# Patient Record
Sex: FEMALE | Race: WHITE | NOT HISPANIC OR LATINO | Employment: FULL TIME | URBAN - METROPOLITAN AREA
[De-identification: names, ages, dates, MRNs, and addresses within clinical notes are randomized per-mention and may not be internally consistent; named-entity substitution may affect disease eponyms.]

---

## 2023-10-24 PROBLEM — N92.6 ABNORMAL MENSES: Status: ACTIVE | Noted: 2023-10-24

## 2023-10-24 RX ORDER — SITAGLIPTIN AND METFORMIN HYDROCHLORIDE 1000; 50 MG/1; MG/1
TABLET, FILM COATED ORAL
COMMUNITY
Start: 2016-07-26 | End: 2024-01-26

## 2023-10-24 RX ORDER — GLIMEPIRIDE 2 MG/1
2 TABLET ORAL
COMMUNITY
Start: 2016-07-26

## 2023-10-26 ENCOUNTER — OFFICE VISIT (OUTPATIENT)
Dept: OBSTETRICS AND GYNECOLOGY | Facility: CLINIC | Age: 53
End: 2023-10-26
Payer: COMMERCIAL

## 2023-10-26 VITALS
WEIGHT: 231 LBS | DIASTOLIC BLOOD PRESSURE: 100 MMHG | SYSTOLIC BLOOD PRESSURE: 165 MMHG | HEIGHT: 66 IN | BODY MASS INDEX: 37.12 KG/M2

## 2023-10-26 DIAGNOSIS — R19.03 RIGHT LOWER QUADRANT ABDOMINAL SWELLING, MASS AND LUMP: ICD-10-CM

## 2023-10-26 DIAGNOSIS — R21 SKIN RASH: ICD-10-CM

## 2023-10-26 DIAGNOSIS — N89.8 VAGINAL DISCHARGE: Primary | ICD-10-CM

## 2023-10-26 PROCEDURE — 87205 SMEAR GRAM STAIN: CPT

## 2023-10-26 PROCEDURE — 99203 OFFICE O/P NEW LOW 30 MIN: CPT | Performed by: OBSTETRICS & GYNECOLOGY

## 2023-10-26 RX ORDER — FLUCONAZOLE 150 MG/1
150 TABLET ORAL ONCE
Qty: 2 TABLET | Refills: 1 | Status: SHIPPED | OUTPATIENT
Start: 2023-10-26 | End: 2023-10-26

## 2023-10-26 RX ORDER — CLOTRIMAZOLE AND BETAMETHASONE DIPROPIONATE 10; .64 MG/G; MG/G
1 CREAM TOPICAL 2 TIMES DAILY
Qty: 15 G | Refills: 2 | Status: SHIPPED | OUTPATIENT
Start: 2023-10-26 | End: 2023-11-23

## 2023-10-26 ASSESSMENT — ENCOUNTER SYMPTOMS
HEMATURIA: 0
DIARRHEA: 0
DYSURIA: 0
SHORTNESS OF BREATH: 0
ACTIVITY CHANGE: 0
CONSTIPATION: 0
NAUSEA: 0

## 2023-10-27 LAB
CLUE CELLS VAG LPF-#/AREA: ABNORMAL /[LPF]
NUGENT SCORE: 1
YEAST VAG WET PREP-#/AREA: PRESENT

## 2023-10-27 NOTE — PROGRESS NOTES
Subjective   Patient ID: Lindsey Tavares is a 53 y.o. female who presents for Mass (Lump on pelvic area).  HPI this is a 53-year-old female that has noted a lump that is near the right upper suprapubic area.  She does feel it may be at the edge of a prior  section scar.  She also mentions a rash under her pannus.  She does have type 2 diabetes.  She denies vaginal discharge, no postmenopausal bleeding, no pelvic pain, no dysuria or change in bowel habits.    Review of Systems   Constitutional:  Negative for activity change.   Respiratory:  Negative for shortness of breath.    Gastrointestinal:  Negative for constipation, diarrhea and nausea.   Genitourinary:  Negative for dysuria, hematuria, vaginal bleeding and vaginal discharge.       Objective this is a well-appearing female sitting comfortably on the table, in no distress.  She has a large abdominal pannus.  She points to an area at the right lower abdomen, with some soft changes that could be consistent with a incisional hernia.  There is no inguinal hernia noted.  On lifting the pannus there is patchy areas of well-demarcated red areas varying in size, largest about 1 x 1 cm.  The could be suspicious for a dermatological source such as psoriasis but she declines having this disease.  On external genitalia exam there were no lesions.  On speculum exam a swab was collected for BV and yeast due to the presence of a small to moderate amount of white discharge.  Physical Exam    Assessment/Plan     This is a 53-year-old female with obesity and type 2 diabetes.  Some red patches could be from a yeast skin infection I did recommend using Diflucan orally and Lotrisone topically.  There is no acute changes on the right lower abdomen although I suspect a hernia could be present.  I placed a General surgery consult.  I did send a swab for BV and yeast from the vaginal discharge.  I do recommend also scheduling for routine follow-up as she is due for her Pap smear  as well.

## 2024-01-26 ENCOUNTER — OFFICE VISIT (OUTPATIENT)
Dept: OBSTETRICS AND GYNECOLOGY | Facility: CLINIC | Age: 54
End: 2024-01-26
Payer: COMMERCIAL

## 2024-01-26 VITALS
HEIGHT: 66 IN | HEART RATE: 98 BPM | WEIGHT: 233 LBS | DIASTOLIC BLOOD PRESSURE: 93 MMHG | BODY MASS INDEX: 37.45 KG/M2 | SYSTOLIC BLOOD PRESSURE: 166 MMHG

## 2024-01-26 DIAGNOSIS — N95.8 GENITOURINARY SYNDROME OF MENOPAUSE: ICD-10-CM

## 2024-01-26 DIAGNOSIS — Z01.419 ENCOUNTER FOR GYNECOLOGICAL EXAMINATION WITHOUT ABNORMAL FINDING: Primary | ICD-10-CM

## 2024-01-26 PROCEDURE — 88175 CYTOPATH C/V AUTO FLUID REDO: CPT

## 2024-01-26 PROCEDURE — 1036F TOBACCO NON-USER: CPT | Performed by: OBSTETRICS & GYNECOLOGY

## 2024-01-26 PROCEDURE — 87624 HPV HI-RISK TYP POOLED RSLT: CPT

## 2024-01-26 PROCEDURE — 99396 PREV VISIT EST AGE 40-64: CPT | Performed by: OBSTETRICS & GYNECOLOGY

## 2024-01-26 RX ORDER — METFORMIN HYDROCHLORIDE 1000 MG/1
1000 TABLET ORAL 2 TIMES DAILY
COMMUNITY

## 2024-01-26 RX ORDER — ESTRADIOL 0.1 MG/G
0.5 CREAM VAGINAL NIGHTLY
Qty: 42.5 G | Refills: 3 | Status: SHIPPED | OUTPATIENT
Start: 2024-01-26 | End: 2025-01-25

## 2024-01-26 RX ORDER — DULAGLUTIDE 1.5 MG/.5ML
1.5 INJECTION, SOLUTION SUBCUTANEOUS
COMMUNITY

## 2024-01-26 NOTE — PROGRESS NOTES
Subjective   Lindsey Tavares is a 53 y.o. female here for a routine exam. Current complaints: She has diabetes and occasionally has a skin rash that comes and goes.  She had a pelvic ultrasound in 2021 that was normal.  She has no postmenopausal bleeding, no dysuria or change in bowel habits.  She does notice vaginal dryness.  She is , and sexually active with her .  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  Both her daughters go there.. Personal health questionnaire reviewed: yes.     Gynecologic History  Patient's last menstrual period was 2020.  Contraception: post menopausal status  Last Pap: 19. Results were: normal  Last mammogram: 21. Results were: normal    Obstetric History  OB History    Para Term  AB Living   2 2           SAB IAB Ectopic Multiple Live Births                  # Outcome Date GA Lbr Deonte/2nd Weight Sex Delivery Anes PTL Lv   2 Para            1 Para                Objective   Constitutional: Alert and in no acute distress. Well developed, well nourished.   Head and Face: Head and face: Normal.    Eyes: Normal external exam - nonicteric sclera, extraocular movements intact (EOMI) and no ptosis.   Neck: No neck asymmetry. Supple. Thyroid not enlarged and there were no palpable thyroid nodules.    Pulmonary: No respiratory distress.   Chest: Breasts: Normal appearance, no nipple discharge and no skin changes. Palpation of breasts and axillae: No palpable mass and no axillary lymphadenopathy.   Abdomen: Soft nontender; no abdominal mass palpated. No organomegaly. No hernias.   Genitourinary: External genitalia: Normal. No inguinal lymphadenopathy. Bartholin's Urethral and Skenes Glands: Normal. Urethra: Normal.  Bladder: Normal on palpation. Vagina: Normal. Cervix: Normal.  Uterus: Normal.  Right Adnexa/parametria: Normal.  Left Adnexa/parametria: Normal.  Inspection of Perianal Area: Normal.   Musculoskeletal: No joint swelling seen, normal  movements of all extremities.   Skin: Normal skin color and pigmentation, normal skin turgor, and no rash.   Neurologic: Non-focal. Grossly intact.   Psychiatric: Alert and oriented x 3. Affect normal to patient baseline. Mood: Appropriate.  Physical Exam     Assessment/Plan   Healthy female exam.  This is a 53-year-old female that has occasional skin yeast infections.  She will use antifungal cream as needed.  We discussed beginning estradiol cream for the genitourinary syndrome of menopause.  She will use a pea-sized amount at the vaginal opening every night for 2 weeks, then 3 times weekly thereafter.  It could take 8 to 12 weeks or longer for the full effect.  A Pap smear was sent.  A mammogram is ordered with tomosynthesis.  I will see her routinely in 1 year.  Mammogram ordered.

## 2024-07-08 ENCOUNTER — TELEPHONE (OUTPATIENT)
Dept: OBSTETRICS AND GYNECOLOGY | Facility: CLINIC | Age: 54
End: 2024-07-08
Payer: COMMERCIAL

## 2024-07-08 DIAGNOSIS — N95.0 PMB (POSTMENOPAUSAL BLEEDING): Primary | ICD-10-CM

## 2024-07-08 NOTE — TELEPHONE ENCOUNTER
Patient had some postmenopausal spotting in may and now again in July. I put an order in for an ultrasound first, it just needs signed off on. Please advise of what to tell the patient

## 2024-07-08 NOTE — TELEPHONE ENCOUNTER
The patient called the office with episode of spotting in May and July.  I recommend a pelvic ultrasound for postmenopausal bleeding, this was ordered.  I do recommend that she return for an endometrial biopsy.  She will need Cytotec ordered for the night before, and I also recommend ibuprofen 600 mg at 1 hour before the visit for cramping.

## 2024-07-11 ENCOUNTER — TELEPHONE (OUTPATIENT)
Dept: OBSTETRICS AND GYNECOLOGY | Facility: CLINIC | Age: 54
End: 2024-07-11
Payer: COMMERCIAL

## 2024-07-16 ENCOUNTER — HOSPITAL ENCOUNTER (OUTPATIENT)
Dept: RADIOLOGY | Facility: CLINIC | Age: 54
Discharge: HOME | End: 2024-07-16
Payer: COMMERCIAL

## 2024-07-16 DIAGNOSIS — N95.0 PMB (POSTMENOPAUSAL BLEEDING): ICD-10-CM

## 2024-07-16 PROCEDURE — 76856 US EXAM PELVIC COMPLETE: CPT

## 2024-07-16 PROCEDURE — 76830 TRANSVAGINAL US NON-OB: CPT | Performed by: RADIOLOGY

## 2024-07-16 PROCEDURE — 76856 US EXAM PELVIC COMPLETE: CPT | Performed by: RADIOLOGY

## 2024-08-01 ENCOUNTER — APPOINTMENT (OUTPATIENT)
Dept: OBSTETRICS AND GYNECOLOGY | Facility: CLINIC | Age: 54
End: 2024-08-01
Payer: COMMERCIAL

## 2024-08-01 VITALS
WEIGHT: 241 LBS | SYSTOLIC BLOOD PRESSURE: 172 MMHG | DIASTOLIC BLOOD PRESSURE: 88 MMHG | BODY MASS INDEX: 38.9 KG/M2 | HEART RATE: 100 BPM

## 2024-08-01 DIAGNOSIS — N95.0 PMB (POSTMENOPAUSAL BLEEDING): Primary | ICD-10-CM

## 2024-08-01 PROCEDURE — 58100 BIOPSY OF UTERUS LINING: CPT | Performed by: OBSTETRICS & GYNECOLOGY

## 2024-08-01 PROCEDURE — 88305 TISSUE EXAM BY PATHOLOGIST: CPT

## 2024-08-01 RX ORDER — ATORVASTATIN CALCIUM 10 MG/1
10 TABLET, FILM COATED ORAL EVERY 24 HOURS
COMMUNITY
Start: 2024-04-16

## 2024-08-01 RX ORDER — INSULIN ASPART INJECTION 100 [IU]/ML
INJECTION, SOLUTION SUBCUTANEOUS
COMMUNITY
Start: 2024-06-21

## 2024-08-01 NOTE — PROGRESS NOTES
Patient ID: Lindsey Tavares is a 53 y.o. female with postmenopausal bleeding.  Pap smear January 26, 2024 was negative, high risk HPV negative.    Endometrial biopsy    Date/Time: 8/1/2024 11:15 AM    Performed by: Camilla Dejesus MD  Authorized by: Camilla Dejesus MD    Consent:     Consent obtained: written    Consent given by: patient    Risks discussed: bleeding and infection  Indications:     Indications: postmenopausal bleeding      Chronicity of post-menopausal bleeding: new    Progression of post-menopausal bleeding: waxing and waning  Pre-procedure:     Premeds: NSAID    Procedure:     Prepped with: Betadine    Tenaculum used: yes      Local anesthetic comment: HurriCaine spray was used.    Cervix dilated: no      Number of passes: 3    Number of passes comment: Cervix visualized after grasped with a tenaculum, moderate amount of mucousy material obtained, passed Pipelle x 3 to obtain scant tissue.    Findings:     Cervix: normal      Uterus depth by sound (cm): 6    Specimen collected: specimen collected and sent to pathology      Patient tolerance: tolerated well, no immediate complications  Comments:     Procedure comments: 53-year-old with episodes of brownish vaginal bleeding occasionally pink-tinged on tissue in May and July 2024.  Mild cramping with these episodes.  Cervix was initially poorly visualized, then grasped with a tenaculum.  Pipelle collecting mucousy material, Pipelle passed x 3 to obtain a scant amount of tissue.  Tolerated well.

## 2024-08-07 LAB
LABORATORY COMMENT REPORT: NORMAL
PATH REPORT.FINAL DX SPEC: NORMAL
PATH REPORT.GROSS SPEC: NORMAL
PATH REPORT.RELEVANT HX SPEC: NORMAL
PATH REPORT.TOTAL CANCER: NORMAL

## 2024-08-09 ENCOUNTER — TELEPHONE (OUTPATIENT)
Dept: OBSTETRICS AND GYNECOLOGY | Facility: CLINIC | Age: 54
End: 2024-08-09
Payer: COMMERCIAL

## 2024-08-11 NOTE — RESULT ENCOUNTER NOTE
The endometrial biopsy notes inactive endometrium, with fragments that suggest an endometrial polyp.  This would explain the ultrasound that noted endometrial fluid and a thickened endometrial stripe.  I recommend a hysteroscopy, D&C and removal of the polyp.  A member of the office staff will contact you regarding scheduling.

## 2024-08-11 NOTE — TELEPHONE ENCOUNTER
The endometrial biopsy is benign, but suggests a fragment of polyp.  This would explain the thickened endometrium on ultrasound, and the mucousy material at endometrial biopsy..  I recommend removing this polyp through an outpatient surgery called hysteroscopy, D&C, polypectomy.  I will forward this information to our  and she will contact you about scheduling.

## 2024-08-12 ENCOUNTER — PREP FOR PROCEDURE (OUTPATIENT)
Dept: OBSTETRICS AND GYNECOLOGY | Facility: CLINIC | Age: 54
End: 2024-08-12
Payer: COMMERCIAL

## 2024-08-12 DIAGNOSIS — R93.89 ENDOMETRIAL THICKENING ON ULTRASOUND: ICD-10-CM

## 2024-08-12 DIAGNOSIS — N95.0 PMB (POSTMENOPAUSAL BLEEDING): Primary | ICD-10-CM

## 2024-08-12 NOTE — TELEPHONE ENCOUNTER
Called patient and left voicemail to call office when ready to schedule surgery with Dr. Dejesus.    English Tilley

## 2024-08-30 RX ORDER — OMEPRAZOLE 20 MG/1
20 TABLET, DELAYED RELEASE ORAL
COMMUNITY

## 2024-08-30 NOTE — CPM/PAT H&P
CPM/PAT Evaluation       Name: Lindsey Tavares   /Age: 1970       TELEMEDICINE ENCOUNTER  Patient was interviewed by telephone for preadmission testing perioperative risk assessment prior to surgery.    DATE OF CONSULT: 2024  REFERRING PROVIDER: Dr. Dejesus  SURGERY, DATE, AND LENGTH: Diagnostic hysteroscopy; 2024; 60 minutes    CHIEF COMPLAINT  Postmenopausal bleeding    HPI  Lindsey Tavares is a 53-year-old female who had 2 episodes of postmenopausal spotting/bleeding visualized on bathroom tissue.  Patient had pelvic/transvaginal ultrasound that showed a 7 mm endometrial stripe.  She underwent an endometrial biopsy on 2024 that noted inactive endometrium with fragments that were suggestive of an endometrial polyp.  Patient now scheduled for recommended D&C hysteroscopy with removal of polyp.    ACTIVE PROBLEMS  Patient Active Problem List   Diagnosis    Abnormal menses    PMB (postmenopausal bleeding)    Endometrial thickening on ultrasound        PAST MEDICAL HISTORY  Past Medical History:   Diagnosis Date    GERD (gastroesophageal reflux disease)     Hyperlipidemia     Type 2 diabetes mellitus (Multi)         SURGICAL HISTORY  Past Surgical History:   Procedure Laterality Date     SECTION, CLASSIC N/A 2003    2006 second csection    OTHER SURGICAL HISTORY      Tonsillectomy and adenoidectomy at 6 years old        ANESTHESIA HISTORY  Denies problems with anesthesia in the past such as PONV, prolonged sedation, awareness, dental damage, aspiration, cardiac arrest, difficult intubation, or unexpected hospital admissions. Denies family history of malignant hyperthermia, or pseudocholinesterase deficiency.     SOCIAL HISTORY  Never smoker; occasional alcohol use; no recreational drug use.  Patient states she is able to do moderate ADLs such as heavy housework, light yard work.  Able to walk up a flight of stairs without shortness of breath.  Denies chest pain with activities  or at rest.  METS >4    FAMILY HISTORY  Family History   Problem Relation Name Age of Onset    Kidney disease Mother      Liver disease Mother      Heart disease Father      Diabetes type II Father          ALLERGIES  No Known Allergies     MEDICATIONS  No current facility-administered medications for this encounter.    Current Outpatient Medications:     atorvastatin (Lipitor) 10 mg tablet, 1 tablet (10 mg) once every 24 hours., Disp: , Rfl:     insulin degludec (TRESIBA U-100 INSULIN SUBQ), Inject 1 unit marking on U-100 syringe under the skin once daily. Take as directed per insulin instructions., Disp: , Rfl:     metFORMIN (Glucophage) 1,000 mg tablet, Take 1 tablet (1,000 mg) by mouth 2 times a day., Disp: , Rfl:     omeprazole OTC (PriLOSEC OTC) 20 mg EC tablet, Take 1 tablet (20 mg) by mouth once daily in the morning. Take before meals. Do not crush, chew, or split., Disp: , Rfl:     estradiol (Estrace) 0.01 % (0.1 mg/gram) vaginal cream, Insert 0.125 Applicatorfuls (0.5 g) into the vagina once daily at bedtime. Apply a pea-sized amount at the vaginal opening every night for 2 weeks, then 3 times weekly thereafter., Disp: 42.5 g, Rfl: 3    Fiasp FlexTouch U-100 Insulin 100 unit/mL (3 mL) injection, PLEASE SEE ATTACHED FOR DETAILED DIRECTIONS, Disp: , Rfl:      REVIEW OF SYSTEMS  Review of Systems   Genitourinary:         Postmenopausal bleeding; thickened endometrial stripe   All other systems reviewed and are negative.    STOP BANG:  Obstructive Sleep Apnea (SHON): Low Risk  Total Score: 2              Patient BMI is greater than 35 kg/m2     Patient is over 50 years old    Criteria that do not apply:    Patient snores loudly    Patient is often tired    Patient has been observed to stop breathing during sleep    Patient has high blood pressure or is being treated for high blood pressure    Patient's neck circumference is greater than 17 inches (male) or 16 inches (female)    Patient is male             PHYSICAL EXAM  Deferred    AIRWAY EXAM  Deferred    VITALS  No vitals taken for telemedicine visit  BMI Readings from Last 1 Encounters:   08/01/24 38.90 kg/m²      BP Readings from Last 4 Encounters:   08/01/24 172/88   01/26/24 (!) 166/93   10/26/23 (!) 165/100   08/03/20 122/80        LABS  Active lab orders for BMP, CBC placed on 08/12/2024 by Dr. Hernandez.  Patient expressed understanding that lab work was to be completed within 72 hours of surgery.        ASSESSMENT/PLAN  Postmenopausal bleeding, thickened endometrial stripe on ultrasound  Diagnostic hysteroscopy      This note was created in part upon personal review of patient's medical records.  Speech recognition transcription software was used in the creation of this note. Despite proofreading, several typographical errors might be present that might affect the meaning of the content.

## 2024-08-30 NOTE — PREPROCEDURE INSTRUCTIONS
Pre-Op Instructions & Checklist   Your surgery has been scheduled at [facility] at [address] [place], [address], [address], in the [facility]. Parking is to the left of the main entrance.  You will be contacted about the time of your surgery the day before your surgery (if your surgery is on a Monday you will be called the Friday before surgery). If you are unable to answer the phone, a detailed voicemail message will be left. Make sure that your voicemail box is not full so a message can be left. If you have not received a call by 3:00 pm you may call [phone] between the hours of 3:00 and 4:00 pm. Please be available by phone the night before/day of surgery in case there is a change in the schedule which may require you to arrive earlier/later.     14 DAYS BEFORE SURGERY STOP TAKING WEIGHT LOSS MEDICATIONS      7 DAYS BEFORE SURGERY STOP THESE MEDICATIONS:  Multiple Vitamins containing Vitamin E  Herbal supplements, Fish Oil, garlic pills, turmeric, CoQ enzyme  Stop taking aspirin, and aspirin-containing products as well as NSAID's such as Advil, Motrin, Aleve, Ibuprofen. Tylenol is okay to take for pain relief.   If you are currently taking Coumadin/Warfarin, we will have to coordinate that with your PCP &/or the Anticoagulation Clinic.     THE DAY BEFORE SURGERY:  Do not eat any food after midnight the night before surgery.   You are permitted to have no more than 4 ounces of clear liquids such as water, apple juice, plain tea or coffee (no milk or creamer), clear electrolyte-replenishing drinks such as Pedialyte, Gatorade, or Powerade (not yogurt or pulp-containing smoothies or juices such as orange juice) up to 3 hours before your arrival time.      DAY OF SURGERY TAKE THESE MEDICATIONS (if it is not listed, do not take it.)   Take: Omeprazole; atorvastatin; inject 30 units of Tresiba, your long-acting insulin.    If taking medications in a tablet/capsule form, take  with a small sip of water.    ON THE MORNING OF SURGERY:  *Shower either the night before your surgery or the morning of your surgery  *Do not use moisturizers, creams, lotions or perfume, or make-up.  *Wear comfortable, loose fitting clothing.   *All jewelry and valuables should be left at home.  *Prosthetic devices such as contact lenses, hearing aids, dentures, eyelash extensions, hairpins and body piercings must be removed before surgery. Bring containers for eyeglasses/contacts, dentures, or hearing aids with you.     Diabetics: Please check fasting blood sugars upon waking up.  If fasting blood sugars are <80ml/dl, please drink 100ml/3oz. of apple juice no later than 2 hours prior to surgery.        BRING WITH YOU:   *Photo ID and insurance card  *Current list of medicines and allergies  *Pacemaker/Defibrillator/Heart stent cards  *Copy of your complete Advanced Directive/DHPOA-if applicable     SMOKING:  *Quitting smoking can make a huge difference to your health and recovery from surgery.    *If you need help with quitting, call 1-516mobile mumQUIT-NOW.  Alcohol:  *No alcoholic beverages for 48 hours before surgery.     AFTER OUTPATIENT SURGERY:  *A responsible adult MUST accompany you at the time of discharge and stay with you for 24 hours after your surgery.  *You may NOT drive yourself home after surgery.  * You may use a taxi or ride sharing service ([employer], Uber) to return home ONLY if you are accompanied by a friend or family member  *Instructions for resuming your medications will be provided by your surgeon.     CONTACT SURGEON'S OFFICE IF YOU DEVELOP:  * Fever =/> 100.4 F   * New respiratory symptoms (e.g. cough, shortness of breath, respiratory distress, sore throat)  * Recent loss of taste or smell  *Flu like symptoms such as headache, fatigue or gastrointestinal symptoms  * If you develop any open sores, shingles, burning or painful urination   AND/OR:  * You no longer wish to have the surgery.  * Any other  personal circumstances change that may lead to the need to cancel or defer this surgery.  *You were admitted to any hospital within one week of your planned procedure.     If you have any questions regarding these preoperative instructions you may call [phone]. If you have questions regarding you surgical procedure, or post-operative care/recovery please call your surgeon's office.

## 2024-09-07 ASSESSMENT — ENCOUNTER SYMPTOMS
VOMITING: 0
ABDOMINAL PAIN: 0
FEVER: 0
ABDOMINAL DISTENTION: 0
DIARRHEA: 0
FLANK PAIN: 0
HEMATURIA: 0
ACTIVITY CHANGE: 0
APPETITE CHANGE: 0
NAUSEA: 0

## 2024-09-07 NOTE — H&P
History Of Present Illness  Lindsey Tavares is a 53 y.o. female presenting with episodes of light postmenopausal spotting in May and 2024.  Is mainly brownish discharge or pink tinge on the tissue.  At ultrasound showed a 7 mm endometrial stripe with fluid.  The ovaries are not visualized, no free fluid.  Endometrial biopsy 2024 was limited by a large amount of mucus within the cavity.  Inactive endometrium was obtained with fragments that suggested endometrial polyp.  She is informed consent to proceed with hysteroscopy, D&C and polypectomy..     Past Medical History  Past Medical History:   Diagnosis Date    GERD (gastroesophageal reflux disease)     Hyperlipidemia     Type 2 diabetes mellitus (Multi)        Surgical History  Past Surgical History:   Procedure Laterality Date     SECTION, CLASSIC N/A 2003 second csection    OTHER SURGICAL HISTORY      Tonsillectomy and adenoidectomy at 6 years old        Social History  She reports that she has never smoked. She has never used smokeless tobacco. She reports that she does not currently use alcohol. She reports that she does not use drugs.    Family History  Family History   Problem Relation Name Age of Onset    Kidney disease Mother      Liver disease Mother      Heart disease Father      Diabetes type II Father          Allergies  Patient has no known allergies.    Review of Systems   Constitutional:  Negative for activity change, appetite change and fever.   Gastrointestinal:  Negative for abdominal distention, abdominal pain, diarrhea, nausea and vomiting.   Genitourinary:  Positive for vaginal bleeding. Negative for flank pain, hematuria, vaginal discharge and vaginal pain.        Constitutional: Alert and in no acute distress. Well developed, well nourished.   Head and Face: Head and face: Normal.    Eyes: Normal external exam - nonicteric sclera.  Neck: No neck asymmetry. Supple.     Pulmonary: No respiratory distress.    Abdomen: Soft nontender; no abdominal mass palpated. No organomegaly. No hernias.   Musculoskeletal: No joint swelling seen, normal movements of all extremities.   Skin: Normal skin color and pigmentation, normal skin turgor, and no rash.   Neurologic: Non-focal. Grossly intact.   Psychiatric: Alert and oriented x 3. Affect normal to patient baseline. Mood: Appropriate.  Physical Exam     Last Recorded Vitals  Last menstrual period 05/01/2020.    Relevant Results    No current facility-administered medications for this encounter.    Current Outpatient Medications:     atorvastatin (Lipitor) 10 mg tablet, 1 tablet (10 mg) once every 24 hours., Disp: , Rfl:     insulin degludec (TRESIBA U-100 INSULIN SUBQ), Inject 1 unit marking on U-100 syringe under the skin once daily. Take as directed per insulin instructions., Disp: , Rfl:     metFORMIN (Glucophage) 1,000 mg tablet, Take 1 tablet (1,000 mg) by mouth 2 times a day., Disp: , Rfl:     omeprazole OTC (PriLOSEC OTC) 20 mg EC tablet, Take 1 tablet (20 mg) by mouth once daily in the morning. Take before meals. Do not crush, chew, or split., Disp: , Rfl:     estradiol (Estrace) 0.01 % (0.1 mg/gram) vaginal cream, Insert 0.125 Applicatorfuls (0.5 g) into the vagina once daily at bedtime. Apply a pea-sized amount at the vaginal opening every night for 2 weeks, then 3 times weekly thereafter., Disp: 42.5 g, Rfl: 3    Fiasp FlexTouch U-100 Insulin 100 unit/mL (3 mL) injection, PLEASE SEE ATTACHED FOR DETAILED DIRECTIONS, Disp: , Rfl:      No results found for this or any previous visit (from the past 96 hour(s)).   No results found.    Assessment/Plan   Assessment & Plan  PMB (postmenopausal bleeding)    Endometrial thickening on ultrasound    Is a 53-year-old female with postmenopausal spotting.  Endometrial stripe is thickened to 7 mm with endometrial fluid.  The endometrial biopsy shows evidence of a fragment of polyp.  The plan is to proceed with hysteroscopy, D&C and  polypectomy.  Informed consent was obtained.       I spent 40 minutes in the professional and overall care of this patient.      Camilla Dejesus MD

## 2024-09-09 ENCOUNTER — LAB (OUTPATIENT)
Dept: LAB | Facility: LAB | Age: 54
End: 2024-09-09
Payer: COMMERCIAL

## 2024-09-09 DIAGNOSIS — N95.0 PMB (POSTMENOPAUSAL BLEEDING): ICD-10-CM

## 2024-09-09 LAB
ANION GAP SERPL CALC-SCNC: 11 MMOL/L (ref 10–20)
BUN SERPL-MCNC: 12 MG/DL (ref 6–23)
CALCIUM SERPL-MCNC: 9 MG/DL (ref 8.6–10.3)
CHLORIDE SERPL-SCNC: 102 MMOL/L (ref 98–107)
CO2 SERPL-SCNC: 28 MMOL/L (ref 21–32)
CREAT SERPL-MCNC: 0.6 MG/DL (ref 0.5–1.05)
EGFRCR SERPLBLD CKD-EPI 2021: >90 ML/MIN/1.73M*2
ERYTHROCYTE [DISTWIDTH] IN BLOOD BY AUTOMATED COUNT: 12.8 % (ref 11.5–14.5)
GLUCOSE SERPL-MCNC: 261 MG/DL (ref 74–99)
HCT VFR BLD AUTO: 43.8 % (ref 36–46)
HGB BLD-MCNC: 14.4 G/DL (ref 12–16)
MCH RBC QN AUTO: 27.9 PG (ref 26–34)
MCHC RBC AUTO-ENTMCNC: 32.9 G/DL (ref 32–36)
MCV RBC AUTO: 85 FL (ref 80–100)
NRBC BLD-RTO: 0 /100 WBCS (ref 0–0)
PLATELET # BLD AUTO: 253 X10*3/UL (ref 150–450)
POTASSIUM SERPL-SCNC: 4.3 MMOL/L (ref 3.5–5.3)
RBC # BLD AUTO: 5.17 X10*6/UL (ref 4–5.2)
SODIUM SERPL-SCNC: 137 MMOL/L (ref 136–145)
WBC # BLD AUTO: 7.2 X10*3/UL (ref 4.4–11.3)

## 2024-09-09 PROCEDURE — 85027 COMPLETE CBC AUTOMATED: CPT

## 2024-09-09 PROCEDURE — 36415 COLL VENOUS BLD VENIPUNCTURE: CPT

## 2024-09-09 PROCEDURE — 80048 BASIC METABOLIC PNL TOTAL CA: CPT

## 2024-09-10 ENCOUNTER — ANESTHESIA EVENT (OUTPATIENT)
Dept: OPERATING ROOM | Facility: CLINIC | Age: 54
End: 2024-09-10
Payer: COMMERCIAL

## 2024-09-12 ENCOUNTER — ANESTHESIA (OUTPATIENT)
Dept: OPERATING ROOM | Facility: CLINIC | Age: 54
End: 2024-09-12
Payer: COMMERCIAL

## 2024-09-12 ENCOUNTER — HOSPITAL ENCOUNTER (OUTPATIENT)
Facility: CLINIC | Age: 54
Setting detail: OUTPATIENT SURGERY
Discharge: HOME | End: 2024-09-12
Attending: OBSTETRICS & GYNECOLOGY | Admitting: OBSTETRICS & GYNECOLOGY
Payer: COMMERCIAL

## 2024-09-12 VITALS
HEART RATE: 70 BPM | RESPIRATION RATE: 16 BRPM | OXYGEN SATURATION: 99 % | TEMPERATURE: 97.2 F | WEIGHT: 244.27 LBS | BODY MASS INDEX: 40.7 KG/M2 | SYSTOLIC BLOOD PRESSURE: 169 MMHG | DIASTOLIC BLOOD PRESSURE: 70 MMHG | HEIGHT: 65 IN

## 2024-09-12 DIAGNOSIS — R93.89 ENDOMETRIAL THICKENING ON ULTRASOUND: ICD-10-CM

## 2024-09-12 DIAGNOSIS — N95.0 PMB (POSTMENOPAUSAL BLEEDING): Primary | ICD-10-CM

## 2024-09-12 LAB — GLUCOSE BLD MANUAL STRIP-MCNC: 223 MG/DL (ref 74–99)

## 2024-09-12 PROCEDURE — 3700000002 HC GENERAL ANESTHESIA TIME - EACH INCREMENTAL 1 MINUTE: Performed by: OBSTETRICS & GYNECOLOGY

## 2024-09-12 PROCEDURE — 2500000005 HC RX 250 GENERAL PHARMACY W/O HCPCS: Performed by: OBSTETRICS & GYNECOLOGY

## 2024-09-12 PROCEDURE — 2720000007 HC OR 272 NO HCPCS: Performed by: OBSTETRICS & GYNECOLOGY

## 2024-09-12 PROCEDURE — 7100000009 HC PHASE TWO TIME - INITIAL BASE CHARGE: Performed by: OBSTETRICS & GYNECOLOGY

## 2024-09-12 PROCEDURE — 3600000003 HC OR TIME - INITIAL BASE CHARGE - PROCEDURE LEVEL THREE: Performed by: OBSTETRICS & GYNECOLOGY

## 2024-09-12 PROCEDURE — 82947 ASSAY GLUCOSE BLOOD QUANT: CPT

## 2024-09-12 PROCEDURE — 2500000004 HC RX 250 GENERAL PHARMACY W/ HCPCS (ALT 636 FOR OP/ED)

## 2024-09-12 PROCEDURE — 2500000005 HC RX 250 GENERAL PHARMACY W/O HCPCS

## 2024-09-12 PROCEDURE — 3600000008 HC OR TIME - EACH INCREMENTAL 1 MINUTE - PROCEDURE LEVEL THREE: Performed by: OBSTETRICS & GYNECOLOGY

## 2024-09-12 PROCEDURE — 88305 TISSUE EXAM BY PATHOLOGIST: CPT | Mod: TC,SUR | Performed by: OBSTETRICS & GYNECOLOGY

## 2024-09-12 PROCEDURE — 2500000004 HC RX 250 GENERAL PHARMACY W/ HCPCS (ALT 636 FOR OP/ED): Performed by: ANESTHESIOLOGY

## 2024-09-12 PROCEDURE — 7100000010 HC PHASE TWO TIME - EACH INCREMENTAL 1 MINUTE: Performed by: OBSTETRICS & GYNECOLOGY

## 2024-09-12 PROCEDURE — 58558 HYSTEROSCOPY BIOPSY: CPT | Performed by: OBSTETRICS & GYNECOLOGY

## 2024-09-12 PROCEDURE — 2500000001 HC RX 250 WO HCPCS SELF ADMINISTERED DRUGS (ALT 637 FOR MEDICARE OP): Performed by: OBSTETRICS & GYNECOLOGY

## 2024-09-12 PROCEDURE — 88305 TISSUE EXAM BY PATHOLOGIST: CPT | Performed by: STUDENT IN AN ORGANIZED HEALTH CARE EDUCATION/TRAINING PROGRAM

## 2024-09-12 PROCEDURE — 3700000001 HC GENERAL ANESTHESIA TIME - INITIAL BASE CHARGE: Performed by: OBSTETRICS & GYNECOLOGY

## 2024-09-12 RX ORDER — FENTANYL CITRATE 50 UG/ML
INJECTION, SOLUTION INTRAMUSCULAR; INTRAVENOUS AS NEEDED
Status: DISCONTINUED | OUTPATIENT
Start: 2024-09-12 | End: 2024-09-12

## 2024-09-12 RX ORDER — SODIUM CHLORIDE 0.9 G/100ML
IRRIGANT IRRIGATION AS NEEDED
Status: DISCONTINUED | OUTPATIENT
Start: 2024-09-12 | End: 2024-09-12 | Stop reason: HOSPADM

## 2024-09-12 RX ORDER — IBUPROFEN 600 MG/1
600 TABLET ORAL 4 TIMES DAILY PRN
Qty: 90 TABLET | Refills: 0 | Status: SHIPPED | OUTPATIENT
Start: 2024-09-12

## 2024-09-12 RX ORDER — METOCLOPRAMIDE HYDROCHLORIDE 5 MG/ML
10 INJECTION INTRAMUSCULAR; INTRAVENOUS ONCE AS NEEDED
Status: DISCONTINUED | OUTPATIENT
Start: 2024-09-12 | End: 2024-09-12 | Stop reason: HOSPADM

## 2024-09-12 RX ORDER — ALBUTEROL SULFATE 0.83 MG/ML
2.5 SOLUTION RESPIRATORY (INHALATION) ONCE AS NEEDED
Status: DISCONTINUED | OUTPATIENT
Start: 2024-09-12 | End: 2024-09-12 | Stop reason: HOSPADM

## 2024-09-12 RX ORDER — ONDANSETRON HYDROCHLORIDE 2 MG/ML
INJECTION, SOLUTION INTRAVENOUS AS NEEDED
Status: DISCONTINUED | OUTPATIENT
Start: 2024-09-12 | End: 2024-09-12

## 2024-09-12 RX ORDER — ONDANSETRON HYDROCHLORIDE 2 MG/ML
4 INJECTION, SOLUTION INTRAVENOUS ONCE AS NEEDED
Status: DISCONTINUED | OUTPATIENT
Start: 2024-09-12 | End: 2024-09-12 | Stop reason: HOSPADM

## 2024-09-12 RX ORDER — LIDOCAINE IN NACL,ISO-OSMOT/PF 30 MG/3 ML
0.1 SYRINGE (ML) INJECTION ONCE
Status: DISCONTINUED | OUTPATIENT
Start: 2024-09-12 | End: 2024-09-12 | Stop reason: HOSPADM

## 2024-09-12 RX ORDER — KETOROLAC TROMETHAMINE 30 MG/ML
INJECTION, SOLUTION INTRAMUSCULAR; INTRAVENOUS AS NEEDED
Status: DISCONTINUED | OUTPATIENT
Start: 2024-09-12 | End: 2024-09-12

## 2024-09-12 RX ORDER — FENTANYL CITRATE 50 UG/ML
25 INJECTION, SOLUTION INTRAMUSCULAR; INTRAVENOUS EVERY 5 MIN PRN
Status: DISCONTINUED | OUTPATIENT
Start: 2024-09-12 | End: 2024-09-12 | Stop reason: HOSPADM

## 2024-09-12 RX ORDER — LABETALOL HYDROCHLORIDE 5 MG/ML
5 INJECTION, SOLUTION INTRAVENOUS ONCE AS NEEDED
Status: DISCONTINUED | OUTPATIENT
Start: 2024-09-12 | End: 2024-09-12 | Stop reason: HOSPADM

## 2024-09-12 RX ORDER — FENTANYL CITRATE 50 UG/ML
50 INJECTION, SOLUTION INTRAMUSCULAR; INTRAVENOUS EVERY 5 MIN PRN
Status: DISCONTINUED | OUTPATIENT
Start: 2024-09-12 | End: 2024-09-12 | Stop reason: HOSPADM

## 2024-09-12 RX ORDER — SODIUM CHLORIDE, SODIUM LACTATE, POTASSIUM CHLORIDE, CALCIUM CHLORIDE 600; 310; 30; 20 MG/100ML; MG/100ML; MG/100ML; MG/100ML
100 INJECTION, SOLUTION INTRAVENOUS CONTINUOUS
Status: DISCONTINUED | OUTPATIENT
Start: 2024-09-12 | End: 2024-09-12 | Stop reason: HOSPADM

## 2024-09-12 RX ORDER — PROPOFOL 10 MG/ML
INJECTION, EMULSION INTRAVENOUS AS NEEDED
Status: DISCONTINUED | OUTPATIENT
Start: 2024-09-12 | End: 2024-09-12

## 2024-09-12 RX ORDER — ACETAMINOPHEN 325 MG/1
650 TABLET ORAL EVERY 4 HOURS PRN
Status: DISCONTINUED | OUTPATIENT
Start: 2024-09-12 | End: 2024-09-12 | Stop reason: HOSPADM

## 2024-09-12 RX ORDER — LIDOCAINE HYDROCHLORIDE 20 MG/ML
INJECTION, SOLUTION INFILTRATION; PERINEURAL AS NEEDED
Status: DISCONTINUED | OUTPATIENT
Start: 2024-09-12 | End: 2024-09-12

## 2024-09-12 SDOH — HEALTH STABILITY: MENTAL HEALTH: CURRENT SMOKER: 0

## 2024-09-12 ASSESSMENT — PAIN - FUNCTIONAL ASSESSMENT
PAIN_FUNCTIONAL_ASSESSMENT: 0-10

## 2024-09-12 ASSESSMENT — PAIN SCALES - GENERAL
PAINLEVEL_OUTOF10: 0 - NO PAIN
PAINLEVEL_OUTOF10: 0 - NO PAIN
PAIN_LEVEL: 0
PAINLEVEL_OUTOF10: 0 - NO PAIN
PAINLEVEL_OUTOF10: 0 - NO PAIN

## 2024-09-12 ASSESSMENT — COLUMBIA-SUICIDE SEVERITY RATING SCALE - C-SSRS
6. HAVE YOU EVER DONE ANYTHING, STARTED TO DO ANYTHING, OR PREPARED TO DO ANYTHING TO END YOUR LIFE?: NO
2. HAVE YOU ACTUALLY HAD ANY THOUGHTS OF KILLING YOURSELF?: NO
1. IN THE PAST MONTH, HAVE YOU WISHED YOU WERE DEAD OR WISHED YOU COULD GO TO SLEEP AND NOT WAKE UP?: NO

## 2024-09-12 NOTE — OP NOTE
HYSTEROSCOPY, D and C, Polypectomy Operative Note     Date: 2024  OR Location: [facility] [place] [facility] OR    Name: Lindsey Tavares, : 1970, Age: 53 y.o., MRN: [id number], Sex: female    Diagnosis  Pre-op Diagnosis      * PMB (postmenopausal bleeding) [N95.0]     * Endometrial thickening on ultrasound [R93.89] Post-op Diagnosis     * PMB (postmenopausal bleeding) [N95.0]     * Endometrial thickening on ultrasound [R93.89]     Procedures  HYSTEROSCOPY, D and C, Polypectomy  97107 - MA HYSTEROSCOPY DIAGNOSTIC SEPARATE PROCEDURE      Surgeons      * Camilla Dejesus - Primary    Resident/Fellow/Other Assistant:  Surgeons and Role:  * No surgeons found with a matching role *    Procedure Summary  Anesthesia: Monitor Anesthesia Care  ASA: II  Anesthesia Staff: Anesthesiologist: Unique Sam MD  CRNA: MARK Soto-CRNA  Estimated Blood Loss: 0mL  Intra-op Medications:   Administrations occurring from 0830 to 0930 on 24:   Medication Name Total Dose   sodium chloride 0.9 % irrigation solution 1,250 mL   surgical lubricant gel 1 Application              Anesthesia Record               Intraprocedure I/O Totals          Intake    LR bolus 400.00 mL    Propofol Drip 0.00 mL    The total shown is the total volume documented since Anesthesia Start was filed.    Total Intake 400 mL          Specimen:   ID Type Source Tests Collected by Time   1 : endometrium curettings Tissue ENDOMETRIUM CURETTINGS SURGICAL PATHOLOGY EXAM Camilla Dejesus MD 2024 0917   2 : endocervix curettings Tissue ENDOCERVIX CURETTINGS SURGICAL PATHOLOGY EXAM Camilla Dejesus MD 2024 0918        Staff:   Circulator: Sammi Salazar Person: Nathaly         Drains and/or Catheters: * None in log *    Tourniquet Times:         Implants:     Findings: The exam is limited by habitus.  No adnexal masses noted on exam under anesthesia.  The cavity was filled with endometrial polypoid tissue.  The ostia were  normal.    Indications: Lindsey Tavares is an 53 y.o. female who is having surgery for PMB (postmenopausal bleeding) [N95.0]  Endometrial thickening on ultrasound [R93.89].     The patient was seen in the preoperative area. The risks, benefits, complications, treatment options, non-operative alternatives, expected recovery and outcomes were discussed with the patient. The possibilities of reaction to medication, pulmonary aspiration, injury to surrounding structures, bleeding, recurrent infection, the need for additional procedures, failure to diagnose a condition, and creating a complication requiring transfusion or operation were discussed with the patient. The patient concurred with the proposed plan, giving informed consent.  The site of surgery was properly noted/marked if necessary per policy. The patient has been actively warmed in preoperative area. Preoperative antibiotics are not indicated. Venous thrombosis prophylaxis are not indicated.    Procedure Details: She was brought to the operating room where MAC anesthesia was given without complication.  Her legs were placed in the Huey stirrups and exam under anesthesia was performed.  She was then prepped and draped in the normal sterile fashion.  A speculum was placed in the vaginal vault the anterior lip of the cervix was grasped with a single-tooth tenaculum.  The uterus was sounded to approximately 8 cm.  She was serially dilated to 17 Lithuanian.  The Aveta hysteroscope was then assembled and placed at the cervical os.  With saline used as a distending media, endometrial observation was performed.  As mentioned, endometrial polyp was filling the cavity and initially obscured the ostia.  The resecting device was assembled and the polyp was removed in its entirety.  There is additional polypoid tissue near the left ostia that was also removed.  Hemostasis was excellent.  When adequate tissue was obtained from the endometrial cavity, the instruments were  removed.  An ECC was collected.  All sponge lap and needle counts were correct at the end the procedure.  She did receive IV Toradol intraoperatively. she is awakened from MAC anesthesia without complications.  She was transferred to recovery room in stable condition.  Complications:  None; patient tolerated the procedure well.    Disposition: PACU - hemodynamically stable.  Condition: stable         Additional Details: fluid deficit 60 ml.    Attending Attestation: I performed the procedure.    Camilla Dejesus  Phone Number: [phone]

## 2024-09-12 NOTE — H&P
"History Of Present Illness  Lindsey Tavares is a 53 y.o. female presenting with no changes in H&P.     Past Medical History  Past Medical History:   Diagnosis Date    GERD (gastroesophageal reflux disease)     Hyperlipidemia     Type 2 diabetes mellitus (Multi)        Surgical History  Past Surgical History:   Procedure Laterality Date     SECTION, CLASSIC N/A 2003    2006 second csection    OTHER SURGICAL HISTORY      Tonsillectomy and adenoidectomy at 6 years old        Social History  She reports that she has never smoked. She has never used smokeless tobacco. She reports that she does not currently use alcohol. She reports that she does not use drugs.    Family History  Family History   Problem Relation Name Age of Onset    Kidney disease Mother      Liver disease Mother      Heart disease Father      Diabetes type II Father          Allergies  Patient has no known allergies.    Review of Systems     Physical Exam     Last Recorded Vitals  Blood pressure 169/83, pulse 95, temperature 36 °C (96.8 °F), temperature source Temporal, resp. rate 16, height 1.651 m (5' 5\"), weight 111 kg (244 lb 4.3 oz), last menstrual period 2020, SpO2 96%.    Relevant Results           Assessment/Plan   Assessment & Plan  PMB (postmenopausal bleeding)    Endometrial thickening on ultrasound           I spent 30 minutes in the professional and overall care of this patient.      Camilla Dejesus MD    "

## 2024-09-12 NOTE — ANESTHESIA POSTPROCEDURE EVALUATION
Patient: Lindsey Tavares    Procedure Summary       Date: 09/12/24 Room / Location: [facility] / [facility] OR    Anesthesia Start: 0842 Anesthesia Stop: 0939    Procedure: HYSTEROSCOPY, D and C, Polypectomy Diagnosis:       PMB (postmenopausal bleeding)      Endometrial thickening on ultrasound      (PMB (postmenopausal bleeding) [N95.0])      (Endometrial thickening on ultrasound [R93.89])    Surgeons: Camilla Dejesus MD Responsible Provider: Unique Sam MD    Anesthesia Type: MAC ASA Status: 2            Anesthesia Type: MAC    Vitals Value Taken Time   /89 09/12/24 0939   Temp  09/12/24 0939   Pulse 85 09/12/24 0939   Resp 12 09/12/24 0939   SpO2 96 09/12/24 0939       Anesthesia Post Evaluation    Patient location during evaluation: bedside  Patient participation: complete - patient participated  Level of consciousness: awake and awake and alert  Pain score: 0  Pain management: adequate  Airway patency: patent  Cardiovascular status: acceptable  Respiratory status: acceptable  Hydration status: acceptable  Postoperative Nausea and Vomiting: none        No notable events documented.

## 2024-09-12 NOTE — ANESTHESIA PREPROCEDURE EVALUATION
Patient: Lindsey Tavares    Procedure Information       Date/Time: 24    Procedure: HYSTEROSCOPY, DIAGNOSTIC - This will be a hysteroscopy, D&C with polypectomy with Aveta.    [facility]  [facility] OR    Surgeons: Camilla Dejesus MD          Vitals:    24 0732   BP: 169/83   Pulse: 95   Resp: 16   Temp: 36 °C (96.8 °F)   SpO2: 96%       Past Surgical History:   Procedure Laterality Date   •  SECTION, CLASSIC N/A 2003    2006 second csection   • OTHER SURGICAL HISTORY      Tonsillectomy and adenoidectomy at 6 years old     Past Medical History:   Diagnosis Date   • GERD (gastroesophageal reflux disease)    • Hyperlipidemia    • Type 2 diabetes mellitus (Multi)        Current Facility-Administered Medications:   •  lactated Ringer's infusion, 100 mL/hr, intravenous, Continuous, Allyn Higgins MD, Last Rate: 100 mL/hr at 24 07, 100 mL/hr at 24  Prior to Admission medications    Medication Sig Start Date End Date Taking? Authorizing Provider   atorvastatin (Lipitor) 10 mg tablet 1 tablet (10 mg) once every 24 hours. 24  Yes Historical Provider, MD   estradiol (Estrace) 0.01 % (0.1 mg/gram) vaginal cream Insert 0.125 Applicatorfuls (0.5 g) into the vagina once daily at bedtime. Apply a pea-sized amount at the vaginal opening every night for 2 weeks, then 3 times weekly thereafter. 24 Yes Camilla Dejesus MD   Fiasp FlexTouch U-100 Insulin 100 unit/mL (3 mL) injection PLEASE SEE ATTACHED FOR DETAILED DIRECTIONS 24  Yes Historical Provider, MD   insulin degludec (TRESIBA U-100 INSULIN SUBQ) Inject 1 unit marking on U-100 syringe under the skin once daily. Take as directed per insulin instructions.   Yes Historical Provider, MD   metFORMIN (Glucophage) 1,000 mg tablet Take 1 tablet (1,000 mg) by mouth 2 times a day.   Yes Historical Provider, MD   omeprazole OTC (PriLOSEC OTC) 20 mg EC tablet Take 1 tablet (20  "mg) by mouth once daily in the morning. Take before meals. Do not crush, chew, or split.   Yes Historical Provider, MD     No Known Allergies  Social History     Tobacco Use   • Smoking status: Never   • Smokeless tobacco: Never   Substance Use Topics   • Alcohol use: Not Currently     Comment: Occasional         Chemistry    Lab Results   Component Value Date/Time     09/09/2024 0813    K 4.3 09/09/2024 0813     09/09/2024 0813    CO2 28 09/09/2024 0813    BUN 12 09/09/2024 0813    CREATININE 0.60 09/09/2024 0813    Lab Results   Component Value Date/Time    CALCIUM 9.0 09/09/2024 0813          Lab Results   Component Value Date/Time    WBC 7.2 09/09/2024 0813    HGB 14.4 09/09/2024 0813    HCT 43.8 09/09/2024 0813     09/09/2024 0813     No results found for: \"PROTIME\", \"PTT\", \"INR\"  No results found for this or any previous visit (from the past 4464 hour(s)).  No results found for this or any previous visit from the past 1095 days.        Relevant Problems   No relevant active problems       Clinical information reviewed:   Tobacco  Allergies  Meds   Med Hx  Surg Hx  OB Status  Fam Hx  Soc   Hx        NPO Detail:  NPO/Void Status  Date of Last Liquid: 09/12/24  Time of Last Liquid: 0600  Date of Last Solid: 09/11/24  Time of Last Solid: 1730         Physical Exam    Airway  Mallampati: III  TM distance: >3 FB  Neck ROM: full     Cardiovascular   Rhythm: regular  Rate: normal     Dental        Pulmonary   Breath sounds clear to auscultation     Abdominal        Anesthesia Plan    History of general anesthesia?: yes  History of complications of general anesthesia?: no    ASA 2     MAC     The patient is not a current smoker.    intravenous induction   Anesthetic plan and risks discussed with patient and spouse.    Plan discussed with CRNA.  "

## 2024-09-20 LAB
LABORATORY COMMENT REPORT: NORMAL
PATH REPORT.FINAL DX SPEC: NORMAL
PATH REPORT.GROSS SPEC: NORMAL
PATH REPORT.RELEVANT HX SPEC: NORMAL
PATH REPORT.TOTAL CANCER: NORMAL
RESIDENT REVIEW: NORMAL

## 2024-09-21 NOTE — RESULT ENCOUNTER NOTE
The final pathology from the D&C showed a benign polyp.  There were no surprises, no cancer or abnormalities.  I will see you for your postop visit.

## 2024-09-23 ENCOUNTER — APPOINTMENT (OUTPATIENT)
Dept: OBSTETRICS AND GYNECOLOGY | Facility: CLINIC | Age: 54
End: 2024-09-23
Payer: COMMERCIAL

## 2024-09-23 VITALS
BODY MASS INDEX: 39.05 KG/M2 | WEIGHT: 243 LBS | HEIGHT: 66 IN | SYSTOLIC BLOOD PRESSURE: 106 MMHG | DIASTOLIC BLOOD PRESSURE: 72 MMHG

## 2024-09-23 DIAGNOSIS — N95.0 PMB (POSTMENOPAUSAL BLEEDING): Primary | ICD-10-CM

## 2024-09-23 PROCEDURE — 3008F BODY MASS INDEX DOCD: CPT | Performed by: OBSTETRICS & GYNECOLOGY

## 2024-09-23 PROCEDURE — 99214 OFFICE O/P EST MOD 30 MIN: CPT | Performed by: OBSTETRICS & GYNECOLOGY

## 2024-09-25 NOTE — PROGRESS NOTES
Lindsey Tavares is a 53 y.o. female who presents with a chief complaint of Follow-up (Follow up)  SUBJECTIVE  Had a hysteroscopy D&C on 2024 for benign endometrial polyp.  We reviewed the operating room photographs.  She continues to have a small amount of el-colored spotting off-and-on, the last episode was yesterday, .  She denies any foul odor, no pain or cramps.  No tenderness.  No dysuria or change in bowel habits.    Past Medical History:   Diagnosis Date    GERD (gastroesophageal reflux disease)     Hyperlipidemia     Type 2 diabetes mellitus (Multi)      Past Surgical History:   Procedure Laterality Date     SECTION, CLASSIC N/A 2003 second csection    OTHER SURGICAL HISTORY      Tonsillectomy and adenoidectomy at 6 years old     Social History     Socioeconomic History    Marital status:    Tobacco Use    Smoking status: Never    Smokeless tobacco: Never   Vaping Use    Vaping status: Never Used   Substance and Sexual Activity    Alcohol use: Not Currently     Comment: Occasional    Drug use: Never    Sexual activity: Yes     Partners: Male     Birth control/protection: None     Family History   Problem Relation Name Age of Onset    Kidney disease Mother      Liver disease Mother      Heart disease Father      Diabetes type II Father         OB History    Para Term  AB Living   2 2 2 0 0 2   SAB IAB Ectopic Multiple Live Births   0 0 0 0 2      # Outcome Date GA Lbr Deonte/2nd Weight Sex Type Anes PTL Lv   2 Term            1 Term                OBJECTIVE  No Known Allergies   (Not in a hospital admission)       Review of Systems  Negative except status post hysteroscopy D&C, light spotting.  Physical Exam  General Appearance: well developed, well nourished  Constitutional: Alert and in no acute distress. Well developed, well nourished.   Head and Face: Head and face: Normal.    Eyes: Normal external exam - nonicteric sclera.  Pulmonary: No  "respiratory distress.    Abdomen: Soft nontender.  Genitourinary: External genitalia: Normal. Urethra: Normal.  Bladder: Normal on palpation. Vagina: Normal. Cervix: Normal.  Uterus: Normal.  Right Adnexa/parametria: Normal.  Left Adnexa/parametria: Normal.     Neurologic: Non-focal. Grossly intact.   Psychiatric: Alert and oriented x 3. Affect normal to patient baseline. Mood: Appropriate.  /72   Ht 1.676 m (5' 6\")   Wt 110 kg (243 lb)   LMP 05/01/2020   BMI 39.22 kg/m²    Problem List Items Addressed This Visit    None  This is a 53-year-old female with a normal exam after hysteroscopy D&C  on September 12, 2024.  She had benign polyp.  She continues to have el spotting which is consistent with the recent hysteroscopy.  No evidence of infection.    We discussed follow-up for her routine exam, or sooner as needed.      "

## 2025-01-27 ENCOUNTER — APPOINTMENT (OUTPATIENT)
Dept: OBSTETRICS AND GYNECOLOGY | Facility: CLINIC | Age: 55
End: 2025-01-27
Payer: COMMERCIAL

## 2025-01-27 VITALS
WEIGHT: 248 LBS | BODY MASS INDEX: 39.86 KG/M2 | HEIGHT: 66 IN | DIASTOLIC BLOOD PRESSURE: 90 MMHG | HEART RATE: 109 BPM | SYSTOLIC BLOOD PRESSURE: 171 MMHG

## 2025-01-27 DIAGNOSIS — Z01.419 ENCOUNTER FOR GYNECOLOGICAL EXAMINATION WITHOUT ABNORMAL FINDING: ICD-10-CM

## 2025-01-27 DIAGNOSIS — Z12.11 SCREENING FOR COLON CANCER: Primary | ICD-10-CM

## 2025-01-27 PROCEDURE — 3008F BODY MASS INDEX DOCD: CPT | Performed by: OBSTETRICS & GYNECOLOGY

## 2025-01-27 PROCEDURE — 1036F TOBACCO NON-USER: CPT | Performed by: OBSTETRICS & GYNECOLOGY

## 2025-01-27 PROCEDURE — 99396 PREV VISIT EST AGE 40-64: CPT | Performed by: OBSTETRICS & GYNECOLOGY

## 2025-01-27 RX ORDER — TIRZEPATIDE 2.5 MG/.5ML
INJECTION, SOLUTION SUBCUTANEOUS
COMMUNITY

## 2025-01-27 NOTE — PROGRESS NOTES
Subjective   Lindsey Tavares is a 54 y.o. female here for a routine exam.  She has no postmenopausal bleeding or discharge.  No dysuria or change in bowel habits.  Has never had a colonoscopy or Cologuard yet.   She had a hysteroscopy D&C with polypectomy 2024.  The pathology was benign, no bleeding since.    Personal health questionnaire reviewed: yes.     Gynecologic History  Patient's last menstrual period was 2020.  Contraception: post menopausal status  Last Pap: 24. Results were: normal  Last mammogram: 21. Results were: normal    Obstetric History  OB History    Para Term  AB Living   2 2 2 0 0 2   SAB IAB Ectopic Multiple Live Births   0 0 0 0 2      # Outcome Date GA Lbr Deonte/2nd Weight Sex Type Anes PTL Lv   2 Term            1 Term                Objective   Constitutional: Alert and in no acute distress. Well developed, well nourished.   Head and Face: Head and face: Normal.    Eyes: Normal external exam - nonicteric sclera, extraocular movements intact (EOMI) and no ptosis.   Neck: No neck asymmetry. Supple. Thyroid not enlarged and there were no palpable thyroid nodules.    Pulmonary: No respiratory distress.   Chest: Breasts: Normal appearance, no nipple discharge and no skin changes. Palpation of breasts and axillae: No palpable mass and no axillary lymphadenopathy.   Abdomen: Soft nontender; no abdominal mass palpated. No organomegaly. No hernias.   Genitourinary: External genitalia: Normal. No inguinal lymphadenopathy. Bartholin's Urethral and Skenes Glands: Normal. Urethra: Normal.  Bladder: Normal on palpation. Vagina: Normal. Cervix: Normal.  Uterus: Normal.  Right Adnexa/parametria: Normal.  Left Adnexa/parametria: Normal.  Inspection of Perianal Area: Normal.   Musculoskeletal: No joint swelling seen, normal movements of all extremities.   Skin: Normal skin color and pigmentation, normal skin turgor, and no rash.   Neurologic: Non-focal. Grossly intact.    Psychiatric: Alert and oriented x 3. Affect normal to patient baseline. Mood: Appropriate.  Physical Exam     Assessment/Plan   Healthy female exam.  This is a 54-year-old female with a normal exam.  No Pap smear was sent, she is high risk HPV negative in 2024.    Her routine mammogram was ordered with tomosynthesis.  We discussed colon cancer screening options and a Cologuard was ordered.  I will see her routinely in 1 year.  Education reviewed: self breast exams.  Mammogram ordered.

## 2025-02-13 LAB — NONINV COLON CA DNA+OCC BLD SCRN STL QL: NEGATIVE

## 2025-03-10 ENCOUNTER — HOSPITAL ENCOUNTER (OUTPATIENT)
Dept: RADIOLOGY | Facility: CLINIC | Age: 55
Discharge: HOME | End: 2025-03-10
Payer: COMMERCIAL

## 2025-03-10 VITALS — HEIGHT: 65 IN | WEIGHT: 248 LBS | BODY MASS INDEX: 41.32 KG/M2

## 2025-03-10 DIAGNOSIS — Z01.419 ENCOUNTER FOR GYNECOLOGICAL EXAMINATION WITHOUT ABNORMAL FINDING: ICD-10-CM

## 2025-03-10 PROCEDURE — 77063 BREAST TOMOSYNTHESIS BI: CPT

## 2025-03-10 PROCEDURE — 77063 BREAST TOMOSYNTHESIS BI: CPT | Performed by: RADIOLOGY

## 2025-03-10 PROCEDURE — 77067 SCR MAMMO BI INCL CAD: CPT | Performed by: RADIOLOGY

## 2026-02-06 ENCOUNTER — APPOINTMENT (OUTPATIENT)
Facility: CLINIC | Age: 56
End: 2026-02-06
Payer: COMMERCIAL

## (undated) DEVICE — Device

## (undated) DEVICE — DRESSING, NON-ADHERENT, TELFA, OUCHLESS, 3 X 8 IN, STERILE

## (undated) DEVICE — DRAPE, PAD, PREP, W/ 9 IN CUFF, 24 X 41, LF, NS

## (undated) DEVICE — DEVICE, RESECTING, AVETA FLEX, 2.9MM

## (undated) DEVICE — ACCESSORY, AVETA, WASTE MANAGEMENT WITH CAP

## (undated) DEVICE — GLOVE, SURGICAL, PROTEXIS PI , 7.0, PF, LF